# Patient Record
Sex: FEMALE | Race: WHITE
[De-identification: names, ages, dates, MRNs, and addresses within clinical notes are randomized per-mention and may not be internally consistent; named-entity substitution may affect disease eponyms.]

---

## 2017-03-03 ENCOUNTER — HOSPITAL ENCOUNTER (OUTPATIENT)
Dept: HOSPITAL 58 - RAD | Age: 52
Discharge: HOME | End: 2017-03-03
Attending: FAMILY MEDICINE
Payer: COMMERCIAL

## 2017-03-03 VITALS — BODY MASS INDEX: 25.1 KG/M2

## 2017-03-03 DIAGNOSIS — M54.9: Primary | ICD-10-CM

## 2017-03-03 NOTE — MRI
EXAM:  MRI lumbar spine without IV contrast.    DATE: 3 March 2017. 

  

HISTORY:  Lumbar back pain. 

  

TECHNIQUE:  Sagittal and axial T1W and T2W sequences of the lumbar spine along with sagittal IR and 
coronal T2W sequences were obtained using 1.2 Juliette magnet.  No IV contrast. 

  

COMPARISON:  LS spine series 8 May 2012.  MRI L-spine 24 May 2012. 

  

FINDINGS:  There are five non-rib-bearing lumbar vertebra.  Minimal (6 degrees) leftward curvature o
f the lumbar spine is observed. Small/moderate anterolateral osteophytes are present multiple lumbar
 levels.  No acute lumbar fracture, subluxation, osseous malignancy, or pars interarticularis defect
 is identified.  Lumbar vertebra are normal in height.  T1W bone marrow signal somewhat heterogeneou
s due to areas of fatty infiltration.  Chronic Schmorl's nodes are demonstrated at T11, T12, and L1.
  Lumbar intervertebral discs are normal in height.  No sacral fracture or stress reaction is eviden
t.  SI joints are unremarkable.  Conus medullaris terminates at T12-L1.  Visible spinal cord is norm
al. 

  

No retroperitoneal lymphadenopathy, paraspinal mass, or aortic aneurysm is detected.  Psoas muscles 
are normal.  There is minor bilateral posterior paraspinal muscle atrophy.  Visible portions of the 
liver, spleen, adrenal glands, and kidneys are normal.  No pancreatic neoplasm or acute pancreatitis
 is detected.  Visible bowel loops are unremarkable. 

  

Segmental analysis: 

  

T12-L1:  Minimal posterior disc bulge does not cause central stenosis or foraminal stenosis. 

  

L1-2:  Minor posterior disc bulge with superimposed midline disc protrusion (2.8 mm AP x 8.5 mm dominique
sverse) minor left facet arthropathy, and dorsal epidural fat cause marked narrowing of the thecal s
ac.  Each foramen is patent. 

  

L2-3:  Minor concentric disc bulge, mild facet arthropathy and mild ligamentum flavum hypertrophy ca
use triangulation of the canal and slight bilateral inferior foraminal encroachment. 

  

L3-4:  Small concentric disc bulge, minor facet arthropathy, and dorsal epidural fat cause triangula
tion of the canal and mild bilateral foraminal stenoses. 

  

L4-5:  Small concentric disc bulge, mild facet arthropathy, and mild DDD cause mild bilateral forami
nal stenoses.  No central canal stenosis. 

  

L5-S1:  Small posterior to foraminal disc bulge and minor left facet arthropathy cause minimal/mild 
bilateral foraminal narrowing.  No central canal stenosis. 

  

  

IMPRESSIONS: 

  

1.  Lumbar spine mild/moderate spondylosis, mild facet arthropathy, and mild DDD - similar to May 20
12. 

2.  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 

3.  Moderate/marked central canal stenosis at L1-2. 

4.  Multilevel foraminal narrowing (minimal/mild). 

5.  T - L-spine small, chronic Schmorl's nodes. 

6.  Marrow heterogeneity due to fatty infiltration.

## 2017-05-26 ENCOUNTER — HOSPITAL ENCOUNTER (OUTPATIENT)
Dept: HOSPITAL 58 - RAD | Age: 52
End: 2017-05-26
Attending: FAMILY MEDICINE

## 2017-05-26 VITALS — BODY MASS INDEX: 25.1 KG/M2

## 2017-05-26 DIAGNOSIS — R10.9: Primary | ICD-10-CM

## 2017-05-26 DIAGNOSIS — R07.9: ICD-10-CM

## 2017-05-26 LAB
CREAT SERPL-MCNC: 0.75 MG/DL (ref 0.6–1.3)
GFR SERPLBLD BASED ON 1.73 SQ M-ARVRAT: 81 ML/MIN

## 2017-05-26 PROCEDURE — 82565 ASSAY OF CREATININE: CPT

## 2017-05-26 PROCEDURE — 36415 COLL VENOUS BLD VENIPUNCTURE: CPT

## 2017-05-26 NOTE — DI
EXAM: PA and lateral views of the chest 

  

HISTORY:  Chest pain 

  

COMPARISON:  Chest x-ray 12/28/2016 

  

FINDINGS:  The cardiomediastinal silhouette is normal.  There is no pneumothorax or pleural effusion
.  There is no consolidation, nodule or mass.  There is increased AP diameter of the chest on latera
l view.  The osseous structures demonstrate scattered degenerative disease of the spine with contras
t within the kidneys and surgical clips in the right upper quadrant. 

  

IMPRESSION:  There is mild hyperinflation of the lungs that may suggest a component of obstructive p
ulmonary physiology.

## 2017-05-26 NOTE — CT
EXAM:  CT ABDOMEN AND PELVIS 

  

HISTORY:  Abdominal pain, right flank, weight loss 

  

TECHNIQUE:  CT abdomen and pelvis with intravenous contrast. Images were reconstructed using 5 mm se
ction thickness.  Reformations were prepared.  75 mL Omnipaque. 

COMPARISON:  10/02/2012 

  

FINDINGS: 

  

No focal hepatic or splenic lesion.  Gallbladder has been removed.  Pancreas and adrenal glands are 
within normal limits.  There is no hydronephrosis or evidence of ureteral obstruction.  Normal enhan
cement of the renal parenchyma.  Mild to moderate atherosclerotic disease of the aorta. 

  

No obvious gastric abnormality.  The appendix has been removed per history and is not seen.  Bowel g
as pattern is within normal limits.  Uterus has been removed.  Urinary bladder is normal.  There is 
no ascites or inflammatory infiltration of the abdominal fat. 

  

No ventral abdominal wall hernia.  No acute bony abnormality.  The lung bases are limited by motion,
 although grossly clear.  No evidence of pneumoperitoneum. 

  

  

IMPRESSION: 

1.  No etiology for the patient's symptoms were found. 

2.  Mild to moderate atherosclerotic disease.

## 2018-08-06 ENCOUNTER — HOSPITAL ENCOUNTER (OUTPATIENT)
Dept: HOSPITAL 58 - LAB | Age: 53
Discharge: HOME | End: 2018-08-06

## 2018-08-06 VITALS — BODY MASS INDEX: 25.1 KG/M2

## 2018-08-06 DIAGNOSIS — R10.11: Primary | ICD-10-CM

## 2018-08-06 DIAGNOSIS — R19.7: ICD-10-CM

## 2018-08-06 PROCEDURE — 36415 COLL VENOUS BLD VENIPUNCTURE: CPT

## 2018-08-06 PROCEDURE — 87493 C DIFF AMPLIFIED PROBE: CPT

## 2019-07-04 ENCOUNTER — HOSPITAL ENCOUNTER (EMERGENCY)
Dept: HOSPITAL 58 - ED | Age: 54
Discharge: HOME | End: 2019-07-04

## 2019-07-04 VITALS — SYSTOLIC BLOOD PRESSURE: 134 MMHG | TEMPERATURE: 99.1 F | DIASTOLIC BLOOD PRESSURE: 80 MMHG

## 2019-07-04 VITALS — BODY MASS INDEX: 23.4 KG/M2

## 2019-07-04 DIAGNOSIS — R10.84: Primary | ICD-10-CM

## 2019-07-04 DIAGNOSIS — Z79.899: ICD-10-CM

## 2019-07-04 DIAGNOSIS — I10: ICD-10-CM

## 2019-07-04 DIAGNOSIS — F17.210: ICD-10-CM

## 2019-07-04 DIAGNOSIS — R11.2: ICD-10-CM

## 2019-07-04 PROCEDURE — 80053 COMPREHEN METABOLIC PANEL: CPT

## 2019-07-04 PROCEDURE — 80074 ACUTE HEPATITIS PANEL: CPT

## 2019-07-04 PROCEDURE — 93005 ELECTROCARDIOGRAM TRACING: CPT

## 2019-07-04 PROCEDURE — 36415 COLL VENOUS BLD VENIPUNCTURE: CPT

## 2019-07-04 PROCEDURE — 96372 THER/PROPH/DIAG INJ SC/IM: CPT

## 2019-07-04 PROCEDURE — 82150 ASSAY OF AMYLASE: CPT

## 2019-07-04 PROCEDURE — 83690 ASSAY OF LIPASE: CPT

## 2019-07-04 PROCEDURE — 99283 EMERGENCY DEPT VISIT LOW MDM: CPT

## 2019-07-04 PROCEDURE — 85025 COMPLETE CBC W/AUTO DIFF WBC: CPT

## 2019-07-04 PROCEDURE — 93010 ELECTROCARDIOGRAM REPORT: CPT

## 2019-07-04 NOTE — CT
Exam:  CT scan of the abdomen pelvis without contrast. 

  

Date:  07/04/2019. 

  

Comparison:  05/26/2017. 

  

  

HISTORY:  Right-sided abdominal pain. 

  

  

TECHNIQUE:  Helical scan of the abdomen pelvis was performed without contrast. 

  

FINDINGS:  The lung bases are clear.  The lumbar spine and bony pelvis are within normal limits.  The
 spleen and liver have a uniform attenuation.  Cholecystectomy is noted.  The stomach, pancreas and a
drenal glands are normal.  The kidneys have a normal morphology.  No calculi or hydronephrosis is see
n.  No retroperitoneal adenopathy is present.  Aorta has peripheral calcification and does not exceed
 3 cm.  The small bowel is normal.  The colon, pelvic sidewall and bladder are normal.  There is no f
ree pelvic fluid.  Hysterectomy has been performed.  The rectum and inguinal regions are normal. 

  

Impression:  No acute findings in the abdomen or pelvis. 

  

Cholecystectomy and hysterectomy. 

  

ASVD.

## 2019-07-04 NOTE — ED.PDOC
General


ED Provider: 


Dr. NELIA JETT





Chief Complaint: Nausea/Vomiting


Stated Complaint: abdominal pain


Time Seen by Physician: 09:40 (chronic abdominal pain tyrese present )


Mode of Arrival: Walk-In


Information Source: Patient


Exam Limitations: No limitations


Primary Care Provider: 


MYAH FOUNTAIN





Nursing and Triage Documentation Reviewed and Agree: Yes


Does patient meet sepsis criteria?: No


System Inflammatory Response Syndrome: Not Applicable


Sepsis Protocol: 


For patient's 13 years and over:





Temp is 96.8 and below  and greater


Pulse >90 BPM


Resp >20/minute


Acutely Altered Mental Status





Are patient's symptoms suggestive of a new infection, such as:


   -Pneumonia


   -Skin, Soft Tissue


   -Endocarditis


   -UTI


   -Bone, Joint Infection


   -Implantable Device


   -Acute Abdominal Infection


   -Wound Infection


   -Meningitis


   -Blood Stream Catheter Infection


   -Unknown








GI Complaint Exam





- Abdominal Pain Complaint/Exam


Onset: Gradual


Duration: chronic worse 1 day


Symptoms Are: Still present


Timing: Intermittent


Initial Severity: Moderate


Current Severity: Mild


Location of Pain: Diffuse


Radiates To: Denies: Back, Flank, LLQ, RLQ, Inguinal


Character: Reports: Aching


Aggravating: Reports: None


Alleviating: Reports: None


Associated Signs and Symptoms: Reports: Decreased appetite.  Denies: Diaphoresis

, Fever, Cough, Chest pain, Dizziness, Back pain, Constipation, Blood in stool, 

Dysuria, Urinary frequency, Decreased urine output, Vaginal bleeding, Vaginal 

discharge, Nausea, Vomiting, Diarrhea, Sore throat, Decreased activity


AAA Risk Factors: Reports: Hypertension


Cardiac Risk Factors: Reports: Hypertension


Ectopic Pregnancy Risk Factors: Reports: None


Ovarian Torsion Risk Factors: Reports: None


Surgical Obstruction Risk Factors: Reports: None


Related Surgical History: Reports: None


Patient Rh Status: Unknown


Abdominal Findings: Present: None


Differential Diagnoses: Appendicitis, Bowel Obstruction, Constipation, 

Diverticulitis, Pancreatitis


Quality Indicators for AMI: EKG in 10min.


Quality Indicators for Cardiac Chest Pain: EKG in 10min.





Review of Systems





- Review Of Systems


Constitutional: Reports: Malaise


Eyes: Reports: No symptoms


Ears, Nose, Mouth, Throat: Reports: No symptoms


Respiratory: Reports: No symptoms


Cardiac: Reports: No symptoms


GI: Reports: Abdominal pain, Nausea, Poor appetite, Vomiting


: Reports: No symptoms


Musculoskeletal: Reports: No symptoms


Skin: Reports: No symptoms


Neurological: Reports: No symptoms


Endocrine: Reports: No symptoms


Hematologic/Lymphatic: Reports: No symptoms


All Other Systems: Reviewed and Negative





Past Medical History





- Past Medical History


Previously Healthy: Yes


Endocrine: Reports: None


Cardiovascular: Reports: Hypertension


Respiratory: Reports: None


Hematological: Reports: None


Gastrointestinal: Reports: None


Genitourinary: Reports: None


Neuro/Psych: Reports: None


Musculoskeletal: Reports: None


Cancer: Reports: None


Last Menstrual Period: N/A





- Surgical History


General Surgical History: Reports: Cholecystectomy, Other (ercp)





- Family History


Family History: Reports: None





- Social History


Smoking Status: Current every day smoker


Hx Substance Use: No


Alcohol Screening: Occasionally





- Immunizations


Tetanus Shot up to Date: No





Physical Exam





- Physical Exam


Appearance: Well-appearing, No pain distress, Well-nourished


Eyes: MIRA, EOMI, Conjunctiva clear


ENT: Ears normal, Nose normal, Oropharynx normal


Respiratory: Airway patent, Breath sounds clear, Breath sounds equal, 

Respirations nonlabored


Cardiovascular: RRR, Pulses normal, No rub, No murmur


GI/: Soft, Nontender, No masses, Bowel sounds normal, No Organomegaly


Musculoskeletal: Normal strength, ROM intact, No edema, No calf tenderness


Skin: Warm, Dry, Normal color


Neurological: Sensation intact, Motor intact, Reflexes intact, Cranial nerves 

intact, Alert, Oriented


Psychiatric: Affect appropriate, Mood appropriate





Interpretation





- Radiology Interpretation


Radiology Interpretation By: Radiologist


Radiology Results: No acute changes





Re-Evaluation





- Re-Evaluation


Time of Re-Evaluation: 11:00


Status: Improved


Vital Signs Stable: Yes


Pain Level: 0


Appearance: NAD


Lungs: Clear


Skin: Warm and Dry


Neuro: Alert and Oriented X3


CV: RRR





- Re-Evaluation


Time of Re-Evaluation: 11:28


Status: Improved


Vital Signs Stable: Yes


Pain Level: 0


Appearance: NAD


Skin: Warm and Dry


Neuro: Alert and Oriented X3


CV: RRR





Critical Care Note





- Critical Care Note


Total Time (mins): 0





Course





- Course


Hematology/Chemistry: 


 07/04/19 10:17





 07/04/19 10:17


Orders, Labs, Meds: 





Lab Review











  07/04/19 07/04/19





  10:17 10:17


 


WBC  6.05 


 


RBC  4.27 


 


Hgb  13.7 


 


Hct  40.5 


 


MCV  94.8 


 


MCH  32.1 H 


 


MCHC  33.8 


 


RDW Coeff of Breonna  12.5 


 


Plt Count  167 


 


Immature Gran % (Auto)  0.5 


 


Neut % (Auto)  64.1 


 


Lymph % (Auto)  23.8 


 


Mono % (Auto)  8.6 


 


Eos % (Auto)  2.0 


 


Baso % (Auto)  1.0 


 


Immature Gran # (Auto)  0.0 


 


Neut # (Auto)  3.9 


 


Lymph # (Auto)  1.4 


 


Mono # (Auto)  0.5 


 


Eos # (Auto)  0.1 


 


Baso # (Auto)  0.1 


 


Sodium   137.9


 


Potassium   3.52


 


Chloride   104.3


 


Carbon Dioxide   22.8


 


Anion Gap   14.32


 


BUN   11.3


 


Creatinine   0.55 L


 


Estimated GFR (MDRD)   114.00


 


BUN/Creatinine Ratio   20.54


 


Glucose   97.5


 


Calcium   9.46


 


Total Bilirubin   0.77


 


AST   50.1 H


 


ALT   66.7 H


 


Alkaline Phosphatase   68.7


 


Total Protein   7.49


 


Albumin   4.50


 


Globulin   2.99


 


Albumin/Globulin Ratio   1.50


 


Amylase   60.8


 


Lipase   61.1








Orders











 Category Date Time Status


 


 EKG-(ED ONLY) Stat CARDIO  07/04/19 10:11 Completed


 


 AMYLASE Stat LAB  07/04/19 10:17 Completed


 


 CBC W/ AUTO DIFF Stat LAB  07/04/19 10:17 Completed


 


 COMPREHENSIVE METABOLIC PANEL Stat LAB  07/04/19 10:17 Completed


 


 LIPASE Stat LAB  07/04/19 10:17 Completed


 


 URINALYSIS C & S IF INDICATED Stat LAB  07/04/19 10:10 Uncollected


 


 Morphine Sulfate [Morphine 4 mg/ml Syringe] MEDS  07/04/19 10:11 Discontinued





 4 mg IM ONCE STA   


 


 Ondansetron HCl/Pf [Zofran 4 mg/2 ml] MEDS  07/04/19 10:11 Discontinued





 4 mg IM ONCE STA   


 


 CHEST, 2 VIEWS PA & LAT Stat RADS  07/04/19 10:10 Completed


 


 CT ABDOMEN/PELVIS WO CONTRAST Stat RADS  07/04/19 10:10 Completed








Medications














Discontinued Medications














Generic Name Dose Route Start Last Admin





  Trade Name Freq  PRN Reason Stop Dose Admin


 


Morphine Sulfate  4 mg  07/04/19 10:11  07/04/19 10:29





  Morphine 4 Mg/Ml Syringe  IM  07/04/19 10:12  4 mg





  ONCE STA   Administration





     





     





     





     


 


Ondansetron HCl  4 mg  07/04/19 10:11  07/04/19 10:29





  Zofran 4 Mg/2 Ml  IM  07/04/19 10:12  4 mg





  ONCE STA   Administration





     





     





     





     











Vital Signs: 





 











  Temp Pulse Resp BP Pulse Ox


 


 07/04/19 09:33  99.1 F  82  20  134/80  96














Departure





- Departure


Time of Disposition: 11:28


Disposition: HOME SELF-CARE


Discharge Problem: 


Abdominal pain


Qualifiers:


 Abdominal location: generalized Qualified Code(s): R10.84 - Generalized 

abdominal pain





Instructions:  Acute Abdominal Pain (ED), Chronic Abdominal Pain (ED)


Condition: Good


Pt referred to PMD for follow-up: Yes


IPMP verified?: No


Additional Instructions: 


Please call your Family Physician as soon as possible to schedule a follow-up 

appointment.


Allergies/Adverse Reactions: 


Allergies





Penicillins Adverse Reaction (Verified 04/10/15 23:13)


 








Home Medications: 


Ambulatory Orders





Amlodipine Besylate [Norvasc] 10 mg PO DAILY 04/10/15 


Lisinopril 20 mg PO DAILY 04/10/15 


Clonazepam 2 mg PO BEDTIME 04/11/15 


Clonidine HCl 0.1 mg PO BID 04/11/15 


Hydrochlorothiazide 12.5 mg PO DAILY 04/11/15 


Metoprolol Succinate [Toprol Xl] 50 mg PO BID 04/11/15 


Omeprazole [Prilosec] 20 mg PO DAILY 04/11/15 


Spironolactone 25 mg PO DAILY 04/11/15 


Venlafaxine HCl [Effexor Xr] 300 mg PO DAILY 04/11/15 


Hydrocodone Bit/Acetaminophen [Norco ] 1 each PO Q6HR #10 tablet 07/04/19 


Ondansetron HCl [Zofran] 4 mg PO BID #5 tablet 07/04/19

## 2019-07-04 NOTE — DI
Exam:  Two-view chest x-ray. 

  

Date:  07/04/2019. 

  

Comparison:  05/26/2017. 

  

  

HISTORY:  Cough. 

  

FINDINGS:  No acute osseous abnormalities are seen.  There is borderline hyperinflation.  There is no
 focal consolidation.  Cardiac silhouette and pulmonary vasculature are normal. 

  

Impression:  No acute intrathoracic findings.  Mild hyperinflation.